# Patient Record
Sex: FEMALE | Race: WHITE | HISPANIC OR LATINO | Employment: FULL TIME | URBAN - METROPOLITAN AREA
[De-identification: names, ages, dates, MRNs, and addresses within clinical notes are randomized per-mention and may not be internally consistent; named-entity substitution may affect disease eponyms.]

---

## 2017-08-05 ENCOUNTER — APPOINTMENT (OUTPATIENT)
Dept: RADIOLOGY | Facility: MEDICAL CENTER | Age: 22
End: 2017-08-05
Attending: EMERGENCY MEDICINE
Payer: COMMERCIAL

## 2017-08-05 ENCOUNTER — HOSPITAL ENCOUNTER (EMERGENCY)
Facility: MEDICAL CENTER | Age: 22
End: 2017-08-05
Attending: EMERGENCY MEDICINE
Payer: COMMERCIAL

## 2017-08-05 VITALS
TEMPERATURE: 97 F | RESPIRATION RATE: 18 BRPM | WEIGHT: 166.23 LBS | HEART RATE: 69 BPM | BODY MASS INDEX: 30.59 KG/M2 | SYSTOLIC BLOOD PRESSURE: 116 MMHG | HEIGHT: 62 IN | OXYGEN SATURATION: 96 % | DIASTOLIC BLOOD PRESSURE: 78 MMHG

## 2017-08-05 DIAGNOSIS — O20.0 THREATENED MISCARRIAGE: ICD-10-CM

## 2017-08-05 LAB
ALBUMIN SERPL BCP-MCNC: 3.9 G/DL (ref 3.2–4.9)
ALBUMIN/GLOB SERPL: 1.4 G/DL
ALP SERPL-CCNC: 43 U/L (ref 30–99)
ALT SERPL-CCNC: 7 U/L (ref 2–50)
ANION GAP SERPL CALC-SCNC: 6 MMOL/L (ref 0–11.9)
APPEARANCE UR: CLEAR
AST SERPL-CCNC: 13 U/L (ref 12–45)
B-HCG SERPL-ACNC: ABNORMAL MIU/ML (ref 0–5)
BASOPHILS # BLD AUTO: 0.4 % (ref 0–1.8)
BASOPHILS # BLD: 0.05 K/UL (ref 0–0.12)
BILIRUB SERPL-MCNC: 0.4 MG/DL (ref 0.1–1.5)
BILIRUB UR QL STRIP.AUTO: NEGATIVE
BUN SERPL-MCNC: 7 MG/DL (ref 8–22)
CALCIUM SERPL-MCNC: 9 MG/DL (ref 8.5–10.5)
CHLORIDE SERPL-SCNC: 107 MMOL/L (ref 96–112)
CO2 SERPL-SCNC: 18 MMOL/L (ref 20–33)
COLOR UR: ABNORMAL
CREAT SERPL-MCNC: 0.43 MG/DL (ref 0.5–1.4)
CULTURE IF INDICATED INDCX: NO UA CULTURE
EOSINOPHIL # BLD AUTO: 0.12 K/UL (ref 0–0.51)
EOSINOPHIL NFR BLD: 0.9 % (ref 0–6.9)
ERYTHROCYTE [DISTWIDTH] IN BLOOD BY AUTOMATED COUNT: 44.1 FL (ref 35.9–50)
GFR SERPL CREATININE-BSD FRML MDRD: >60 ML/MIN/1.73 M 2
GLOBULIN SER CALC-MCNC: 2.8 G/DL (ref 1.9–3.5)
GLUCOSE SERPL-MCNC: 86 MG/DL (ref 65–99)
GLUCOSE UR STRIP.AUTO-MCNC: NEGATIVE MG/DL
HCG UR QL: POSITIVE
HCT VFR BLD AUTO: 39.3 % (ref 37–47)
HGB BLD-MCNC: 13.6 G/DL (ref 12–16)
IMM GRANULOCYTES # BLD AUTO: 0.06 K/UL (ref 0–0.11)
IMM GRANULOCYTES NFR BLD AUTO: 0.4 % (ref 0–0.9)
KETONES UR STRIP.AUTO-MCNC: 100 MG/DL
LEUKOCYTE ESTERASE UR QL STRIP.AUTO: NEGATIVE
LIPASE SERPL-CCNC: 20 U/L (ref 11–82)
LYMPHOCYTES # BLD AUTO: 2.58 K/UL (ref 1–4.8)
LYMPHOCYTES NFR BLD: 19.1 % (ref 22–41)
MCH RBC QN AUTO: 29.5 PG (ref 27–33)
MCHC RBC AUTO-ENTMCNC: 34.6 G/DL (ref 33.6–35)
MCV RBC AUTO: 85.2 FL (ref 81.4–97.8)
MICRO URNS: ABNORMAL
MONOCYTES # BLD AUTO: 0.71 K/UL (ref 0–0.85)
MONOCYTES NFR BLD AUTO: 5.2 % (ref 0–13.4)
NEUTROPHILS # BLD AUTO: 10.02 K/UL (ref 2–7.15)
NEUTROPHILS NFR BLD: 74 % (ref 44–72)
NITRITE UR QL STRIP.AUTO: NEGATIVE
NRBC # BLD AUTO: 0 K/UL
NRBC BLD AUTO-RTO: 0 /100 WBC
PH UR STRIP.AUTO: 6 [PH]
PLATELET # BLD AUTO: 229 K/UL (ref 164–446)
PMV BLD AUTO: 9.4 FL (ref 9–12.9)
POTASSIUM SERPL-SCNC: 3.6 MMOL/L (ref 3.6–5.5)
PROT SERPL-MCNC: 6.7 G/DL (ref 6–8.2)
PROT UR QL STRIP: NEGATIVE MG/DL
RBC # BLD AUTO: 4.61 M/UL (ref 4.2–5.4)
RBC UR QL AUTO: NEGATIVE
SODIUM SERPL-SCNC: 131 MMOL/L (ref 135–145)
SP GR UR REFRACTOMETRY: 1.01
SP GR UR STRIP.AUTO: 1.01
WBC # BLD AUTO: 13.5 K/UL (ref 4.8–10.8)

## 2017-08-05 PROCEDURE — 84702 CHORIONIC GONADOTROPIN TEST: CPT

## 2017-08-05 PROCEDURE — 81025 URINE PREGNANCY TEST: CPT

## 2017-08-05 PROCEDURE — 80053 COMPREHEN METABOLIC PANEL: CPT

## 2017-08-05 PROCEDURE — 76817 TRANSVAGINAL US OBSTETRIC: CPT

## 2017-08-05 PROCEDURE — 83690 ASSAY OF LIPASE: CPT

## 2017-08-05 PROCEDURE — 99284 EMERGENCY DEPT VISIT MOD MDM: CPT

## 2017-08-05 PROCEDURE — 81003 URINALYSIS AUTO W/O SCOPE: CPT

## 2017-08-05 PROCEDURE — 85025 COMPLETE CBC W/AUTO DIFF WBC: CPT

## 2017-08-05 RX ORDER — ONDANSETRON 4 MG/1
4 TABLET, FILM COATED ORAL EVERY 6 HOURS PRN
Qty: 15 TAB | Refills: 0 | Status: SHIPPED | OUTPATIENT
Start: 2017-08-05

## 2017-08-05 NOTE — ED AVS SNAPSHOT
Home Care Instructions                                                                                                                Rogelio Bolton   MRN: [id number]    Department:  [facility], Emergency Dept   Date of Visit:  8/5/2017            [facility], Emergency Dept    [address] [address]    Phone:  [phone]      You were seen by     Stevan Vasquez M.D.      Your Diagnosis Was     Threatened miscarriage     O20.0       Follow-up Information     1. Follow up with [facility]. Call in 2 days.    Specialty:  OB/Gyn    Contact information    [address] [address] [phone]        Medication Information     Review all of your home medications and newly ordered medications with your primary doctor and/or pharmacist as soon as possible. Follow medication instructions as directed by your doctor and/or pharmacist.     Please keep your complete medication list with you and share with your physician. Update the information when medications are discontinued, doses are changed, or new medications (including over-the-counter products) are added; and carry medication information at all times in the event of emergency situations.               Medication List      START taking these medications        Instructions    Morning Afternoon Evening Bedtime    ondansetron 4 MG Tabs tablet   Commonly known as:  ZOFRAN        Take 1 Tab by mouth every 6 hours as needed for Nausea/Vomiting.   Dose:  4 mg                             Where to Get Your Medications      You can get these medications from any pharmacy     Bring a paper prescription for each of these medications    - ondansetron 4 MG Tabs tablet            Procedures and tests performed during your visit     BETA-HCG QUALITATIVE URINE    CARDIAC MONITORING    CBC WITH DIFFERENTIAL    COMP METABOLIC PANEL    ESTIMATED GFR    HCG QUANTITATIVE SERUM    LIPASE    O2 Protocol    REFRACTOMETER SG    SALINE LOCK    URINALYSIS,CULTURE IF INDICATED    US-OB PELVIS TRANSVAGINAL        Discharge Instructions       Threatened Miscarriage  A threatened miscarriage occurs when you have vaginal bleeding during your first 20 weeks of pregnancy but the pregnancy has not ended. If you have vaginal bleeding during this time, your health care provider will do tests to make sure you are still pregnant. If the tests show you are still pregnant and the developing baby (fetus) inside your womb (uterus) is still growing, your condition is considered a threatened miscarriage.  A threatened miscarriage does not mean your pregnancy will end, but it does increase the risk of losing your pregnancy (complete miscarriage).  CAUSES   The cause of a threatened miscarriage is usually not known. If you go on to have a complete miscarriage, the most common cause is an abnormal number of chromosomes in the developing baby. Chromosomes are the structures inside cells that hold all your genetic material.  Some causes of vaginal bleeding that do not result in miscarriage include:  · Having sex.  · Having an infection.  · Normal hormone changes of pregnancy.  · Bleeding that occurs when an egg implants in your uterus.  RISK FACTORS  Risk factors for bleeding in early pregnancy include:  · Obesity.  · Smoking.  · Drinking excessive amounts of alcohol or caffeine.  · Recreational drug use.  SIGNS AND SYMPTOMS  · Light vaginal bleeding.  · Mild abdominal pain or cramps.  DIAGNOSIS   If you have bleeding with or without abdominal pain before 20 weeks of pregnancy, your health care provider will do tests to check whether you are still pregnant. One important test involves using sound waves and a computer (ultrasound) to create images of the inside of your uterus. Other tests include an internal exam of your vagina and uterus (pelvic exam) and measurement of your baby's heart rate.   You may be diagnosed with a threatened  miscarriage if:  · Ultrasound testing shows you are still pregnant.  · Your baby's heart rate is strong.  · A pelvic exam shows that the opening between your uterus and your vagina (cervix) is closed.  · Your heart rate and blood pressure are stable.  · Blood tests confirm you are still pregnant.  TREATMENT   No treatments have been shown to prevent a threatened miscarriage from going on to a complete miscarriage. However, the right home care is important.   HOME CARE INSTRUCTIONS   · Make sure you keep all your appointments for prenatal care. This is very important.  · Get plenty of rest.  · Do not have sex or use tampons if you have vaginal bleeding.  · Do not douche.  · Do not smoke or use recreational drugs.  · Do not drink alcohol.  · Avoid caffeine.  SEEK MEDICAL CARE IF:  · You have light vaginal bleeding or spotting while pregnant.  · You have abdominal pain or cramping.  · You have a fever.  SEEK IMMEDIATE MEDICAL CARE IF:  · You have heavy vaginal bleeding.  · You have blood clots coming from your vagina.  · You have severe low back pain or abdominal cramps.  · You have fever, chills, and severe abdominal pain.  MAKE SURE YOU:  · Understand these instructions.  · Will watch your condition.  · Will get help right away if you are not doing well or get worse.     This information is not intended to replace advice given to you by your health care provider. Make sure you discuss any questions you have with your health care provider.     Document Released: 12/18/2006 Document Revised: 12/23/2014 Document Reviewed: 10/14/2014  [employer] Interactive Patient Education ©2016 [employer]            Patient Information     Patient Information    Following emergency treatment: all patient requiring follow-up care must return either to a private physician or a clinic if your condition worsens before you are able to obtain further medical attention, please return to the emergency room.     Billing Information    At  [facility], we work to make the billing process streamlined for our patients.  Our Representatives are here to answer any questions you may have regarding your hospital bill.  If you have insurance coverage and have supplied your insurance information to us, we will submit a claim to your insurer on your behalf.  Should you have any questions regarding your bill, we can be reached online or by phone as follows:  Online: You are able pay your bills online or live chat with our representatives about any billing questions you may have. We are here to help Monday - Friday from 8:00am to 7:30pm and 9:00am - 12:00pm on Saturdays.  Please visit [email or web address]  for more information.   Phone:  [phone] or [phone]    Please note that your emergency physician, surgeon, pathologist, radiologist, anesthesiologist, and other specialists are not employed by [facility] and will therefore bill separately for their services.  Please contact them directly for any questions concerning their bills at the numbers below:     Emergency Physician Services:  [phone]  [facility]:  [phone]  [facility]:  [phone]  [facility]:  [phone]    1. Your final bill may vary from the amount quoted upon discharge if all procedures are not complete at that time, or if your doctor has additional procedures of which we are not aware. You will receive an additional bill if you return to the Emergency Department at [facility] for suture removal regardless of the facility of which the sutures were placed.     2. Please arrange for settlement of this account at the emergency registration.    3. All self-pay accounts are due in full at the time of treatment.  If you are unable to meet this obligation then payment is expected within 4-5 days.     4. If you have had radiology studies (CT, X-ray, Ultrasound, MRI), you have received a preliminary  result during your emergency department visit. Please contact the radiology department [phone] to receive a copy of your final result. Please discuss the Final result with your primary physician or with the follow up physician provided.     Crisis Hotline:  [place] Crisis Hotline:  1-295-LWNFDUJ or 1-806.301.7448  [place] Crisis Hotline:    1-388.243.9076 or 085-790-1493         ED Discharge Follow Up Questions    1. In order to provide you with very good care, we would like to follow up with a phone call in the next few days.  May we have your permission to contact you?     YES /  NO    2. What is the best phone number to call you? (       )_____-__________    3. What is the best time to call you?      Morning  /  Afternoon  /  Evening                   Patient Signature:  ____________________________________________________________    Date:  ____________________________________________________________

## 2017-08-05 NOTE — ED AVS SNAPSHOT
Snip.ly Access Code: [email or web address]  Expires: 9/4/2017  7:08 PM    Your email address is not on file at [facility].  Email Addresses are required for you to sign up for Snip.ly, please contact [phone] to verify your personal information and to provide your email address prior to attempting to register for Snip.ly.    Rogelio Bolton  [facility] [place] [address]    [employer]  A secure, online tool to manage your health information     [facility]’s Snip.ly® is a secure, online tool that connects you to your personalized health information from the privacy of your home -- day or night - making it very easy for you to manage your healthcare. Once the activation process is completed, you can even access your medical information using the Snip.ly erica, which is available for free in the Apple Erica store or Google Play store.     To learn more about Snip.ly, visit [email or web address]    There are two levels of access available (as shown below):   My Chart Features  [facility] Primary Care Doctor [facility]  Specialists [facility]  Urgent  Care Non-[facility] Primary Care Doctor   Email your healthcare team securely and privately 24/7 X X X    Manage appointments: schedule your next appointment; view details of past/upcoming appointments X      Request prescription refills. X      View recent personal medical records, including lab and immunizations X X X X   View health record, including health history, allergies, medications X X X X   Read reports about your outpatient visits, procedures, consult and ER notes X X X X   See your discharge summary, which is a recap of your hospital and/or ER visit that includes your diagnosis, lab results, and care plan X X  X     How to register for Snip.ly:  Once your e-mail address has been verified, follow the following steps to sign up for Snip.ly.     1. Go to  [email or web address]  2. Click on the Sign Up Now box, which takes you to the New Member Sign Up page. You will need  to provide the following information:  a. Enter your Svbtle Access Code exactly as it appears at the top of this page. (You will not need to use this code after you’ve completed the sign-up process. If you do not sign up before the expiration date, you must request a new code.)   b. Enter your date of birth.   c. Enter your home email address.   d. Click Submit, and follow the next screen’s instructions.  3. Create a Svbtle ID. This will be your Svbtle login ID and cannot be changed, so think of one that is secure and easy to remember.  4. Create a Svbtle password. You can change your password at any time.  5. Enter your Password Reset Question and Answer. This can be used at a later time if you forget your password.   6. Enter your e-mail address. This allows you to receive e-mail notifications when new information is available in Svbtle.  7. Click Sign Up. You can now view your health information.    For assistance activating your Svbtle account, call [phone]

## 2017-08-05 NOTE — ED AVS SNAPSHOT
8/5/2017    Rogelio Bolton  [address] [place] [address]    Dear Rogelio:    [facility] wants to ensure your discharge home is safe and you or your loved ones have had all of your questions answered regarding your care after you leave the hospital.    Below is a list of resources and contact information should you have any questions regarding your hospital stay, follow-up instructions, or active medical symptoms.    Questions or Concerns Regarding… Contact   Medical Questions Related to Your Discharge  (7 days a week, 8am-5pm) Contact a Nurse Care Coordinator   [phone]   Medical Questions Not Related to Your Discharge  (24 hours a day / 7 days a week)  Contact the Nurse Health Line   [phone]    Medications or Discharge Instructions Refer to your discharge packet   or contact your [facility] Primary Care Provider   [phone]   Follow-up Appointment(s) Schedule your appointment via [facility]   or contact Scheduling [phone]   Billing Review your statement via [facility]  or contact Billing [phone]   Medical Records Review your records via [facility]   or contact Medical Records [phone]     You may receive a telephone call within two days of discharge. This call is to make certain you understand your discharge instructions and have the opportunity to have any questions answered. You can also easily access your medical information, test results and upcoming appointments via the [facility] free online health management tool. You can learn more and sign up at [email or web address]. For assistance setting up your [facility] account, please call [phone].    Once again, we want to ensure your discharge home is safe and that you have a clear understanding of any next steps in your care. If you have any questions or concerns, please do not hesitate to contact us, we are here for you. Thank you for choosing [facility] for your healthcare needs.    Sincerely,    Your [facility] Healthcare Team

## 2017-08-06 NOTE — ED PROVIDER NOTES
ED Provider Note    HPI: Patient is a 22-year-old female who presented to the emergency department August 5, 2017 and 5:14 PM with a chief complaint of crampy lower abdominal pain and vomiting.    Symptoms began yesterday. Patient has chronic constipation. She notes crampy lower abdominal pain associated with nausea and vomiting. No chest pain or shortness of breath no fever no chills no cough. Pain is fairly constant. Nothing seems to make it better or worse. No vaginal bleeding or discharge. No other somatic complaints vaginal bleeding    Review of Systems:Positive crampy lower abdominal pain nausea with vomiting chronic constipation negative for fever chills cough chest pain shortness of breath. Review of systems reviewed with patient, all other systems negative    Past medical/surgical history: Constipation    Medications: MiraLAX    Allergies: None    Social History: patient does not smoke no alcohol use      Physical exam: Constitutional: Pleasant female awake and alert  Vital signs:  Temperature 97.0 pulse 68 respirations 16 blood pressure 113/59 pulse oximetry 100%  EYES: PERRL, EOMI, Conjunctivae and sclera normal, eyelids normal bilaterally.  Neck: Trachea midline. No cervical masses seen or palpated. Normal range of motion, supple. No meningeal signs elicited.  Cardiac: Regular rate and rhythm. S1-S2 present. No S3 or S4 present. No murmurs, rubs, or gallops heard. No edema or varicosities were seen.   Lungs: Clear to auscultation with good aeration throughout. No wheezes, rales, or rhonchi heard. Patient's chest wall moved symmetrically with each respiratory effort. Patient was not making use of accessory muscles of respiration in breathing.  Abdomen: Soft nontender to palpation. Objective pain in both lower quadrants and not increased with palpation. No rebound or guarding elicited. No organomegaly identified. No pulsatile abdominal masses identified.   Musculoskeletal:  no  pain with palpitation or  movement of muscle, bone or joint , no obvious musculoskeletal deformities identified.  Neurologic: alert and awake answers questions appropriately. Moves all four extremities independently, no gross focal abnormalities identified. Normal strength and motor.  Skin: no rash or lesion seen, no palpable dermatologic lesions identified.  Psychiatric: He appeared to be slightly anxious but none appropriately so. She was not delusional or hallucinating.    Medical decision making:  Laboratory studies were obtained (please see labsheet results) significant findings includedUnremarkable urinalysis positive pregnancy test mild cytosis 13.5 unremarkable chemistry panel except for mild hyponatremia sodium 131 and mild decrease in the O2 of 18 which could indicate some degree of dehydration.white count slightly elevated at 13.5 but there is no preponderance of intracranial sites making systemic infection unlikely. As there has been no vaginal bleeding testing for Rhogam was not indicated    Pelvic ultrasound obtained; 7 week 2 day IUP seen with a small subchorionic hemorrhage    Patient has a benign abdominal exam. Her symptoms and seemed to be best explained by a physiologic state of pregnancy. Given the presence of pain and a subchorionic hemorrhage in to be a threatened miscarriage. Ectopic pregnancy seems extremely unlikely given the ultrasound findings.    Patient discharged on Zofran. She is given referral to the pregnancy Center for follow-up. She is carefully counseled to return to ED for any increasing pain and vaginal bleeding fever vomiting or other problems    Patient verbalized understanding of these instructions and states she will comply    Impression threatened miscarriage

## 2017-08-06 NOTE — DISCHARGE INSTRUCTIONS
Threatened Miscarriage  A threatened miscarriage occurs when you have vaginal bleeding during your first 20 weeks of pregnancy but the pregnancy has not ended. If you have vaginal bleeding during this time, your health care provider will do tests to make sure you are still pregnant. If the tests show you are still pregnant and the developing baby (fetus) inside your womb (uterus) is still growing, your condition is considered a threatened miscarriage.  A threatened miscarriage does not mean your pregnancy will end, but it does increase the risk of losing your pregnancy (complete miscarriage).  CAUSES   The cause of a threatened miscarriage is usually not known. If you go on to have a complete miscarriage, the most common cause is an abnormal number of chromosomes in the developing baby. Chromosomes are the structures inside cells that hold all your genetic material.  Some causes of vaginal bleeding that do not result in miscarriage include:  · Having sex.  · Having an infection.  · Normal hormone changes of pregnancy.  · Bleeding that occurs when an egg implants in your uterus.  RISK FACTORS  Risk factors for bleeding in early pregnancy include:  · Obesity.  · Smoking.  · Drinking excessive amounts of alcohol or caffeine.  · Recreational drug use.  SIGNS AND SYMPTOMS  · Light vaginal bleeding.  · Mild abdominal pain or cramps.  DIAGNOSIS   If you have bleeding with or without abdominal pain before 20 weeks of pregnancy, your health care provider will do tests to check whether you are still pregnant. One important test involves using sound waves and a computer (ultrasound) to create images of the inside of your uterus. Other tests include an internal exam of your vagina and uterus (pelvic exam) and measurement of your baby's heart rate.   You may be diagnosed with a threatened miscarriage if:  · Ultrasound testing shows you are still pregnant.  · Your baby's heart rate is strong.  · A pelvic exam shows that the  opening between your uterus and your vagina (cervix) is closed.  · Your heart rate and blood pressure are stable.  · Blood tests confirm you are still pregnant.  TREATMENT   No treatments have been shown to prevent a threatened miscarriage from going on to a complete miscarriage. However, the right home care is important.   HOME CARE INSTRUCTIONS   · Make sure you keep all your appointments for prenatal care. This is very important.  · Get plenty of rest.  · Do not have sex or use tampons if you have vaginal bleeding.  · Do not douche.  · Do not smoke or use recreational drugs.  · Do not drink alcohol.  · Avoid caffeine.  SEEK MEDICAL CARE IF:  · You have light vaginal bleeding or spotting while pregnant.  · You have abdominal pain or cramping.  · You have a fever.  SEEK IMMEDIATE MEDICAL CARE IF:  · You have heavy vaginal bleeding.  · You have blood clots coming from your vagina.  · You have severe low back pain or abdominal cramps.  · You have fever, chills, and severe abdominal pain.  MAKE SURE YOU:  · Understand these instructions.  · Will watch your condition.  · Will get help right away if you are not doing well or get worse.     This information is not intended to replace advice given to you by your health care provider. Make sure you discuss any questions you have with your health care provider.     Document Released: 12/18/2006 Document Revised: 12/23/2014 Document Reviewed: 10/14/2014  [employer] Interactive Patient Education ©2016 [employer]

## 2017-08-06 NOTE — ED NOTES
"Pt ambulates to triage with c/c constipation since yesterday, n/v & diffuse abd pain.  \"I don't know what is wrong with me.\"  A&ox4.  Pt to lobby & advised to inform RN of any changes.    "